# Patient Record
Sex: MALE | Race: WHITE
[De-identification: names, ages, dates, MRNs, and addresses within clinical notes are randomized per-mention and may not be internally consistent; named-entity substitution may affect disease eponyms.]

---

## 2019-11-28 NOTE — RAD
Portable chest:



HISTORY: Chest pain



COMPARISON:  none



FINDINGS: Lung fields are clear. Heart and mediastinum appear unremarkable.  Vascularity is normal.

 Visualized osseous structures unremarkable.



IMPRESSION: No acute finding



Reported By: You Felix 

Electronically Signed:  11/28/2019 2:10 PM

## 2019-11-28 NOTE — CON
DATE OF CONSULTATION:  11/28/2019



REQUESTING PHYSICIAN:  Dr. Matute.



CHIEF COMPLAINT:  Chest pain.



HISTORY OF PRESENT ILLNESS:  The patient is a 69-year-old man with known coronary

disease having undergone previous stenting procedure about 9 years ago in [place].

He had done well until recently.  About a week ago, he had an episode of chest pain

that lasted 2 or 3 hours, and then this morning, he had another episode.  It was not

associated with shortness of breath, nausea, vomiting, or diaphoresis, but was of

sufficient severity and lasted long that he decided to present to the emergency

room.  Here, he was found to have some minimal ST elevation in lead III and then

when his pain worsened a bit, the ST elevation was a little bit more pronounced and

present in leads II, III, and aVF.  He was taken to the cath lab urgently.  He was

found to have three-vessel coronary artery disease and included ostial and mid LAD

lesions isolating a fairly large diagonal, a very high-grade ostial lesion in an

obtuse marginal and a very high-grade very proximal lesion in his right coronary

that was associated with filling defect suggestive of thrombus.  His chest pain has

since improved, but has not completely resolved. 



PAST MEDICAL HISTORY:  Significant for hypertension, hypercholesterolemia, and HIV

positivity.  He reports that his viral load has been undetectable having been

checked as recently as 2 weeks ago. 



MEDICATIONS:  His current list of medications is incomplete.



ALLERGIES:  HE DENIES ANY MEDICAL ALLERGIES.



SOCIAL HISTORY:  He describes having smoked heavily, but quit 20 or 30 years ago.



FAMILY HISTORY:  Negative for coronary disease in either of his parents.



REVIEW OF SYSTEMS:  Positive for some blurry vision but no monocular symptoms

consistent with amaurosis fugax.  No transient speech, facial, or extremity symptoms

consistent with TIAs.  Negative for claudication.  Negative for any shortness of

breath. 



PHYSICAL EXAMINATION:

GENERAL:  He currently is in no distress. 

VITAL SIGNS:  On presentation in the emergency room, his heart rate was 80 and blood

pressure 167/95.  His most recent vitals in the cath lab were heart rate 74 and

blood pressure 141/92.  Intravenous nitroglycerin was being started.  He is 5 feet 9

inches and weighs 183 pounds. 

HEENT:  He has no xanthelasma. 

NECK:  No JVD.  No carotid bruits. 

CHEST:  Clear to auscultation. 

CARDIAC:  He has a regular rate and rhythm without any obvious murmur or gallop. 

ABDOMEN:  Soft and nontender.  He has a sheath in place in the right groin. 

EXTREMITIES:  He has a palpable left femoral pulse, left dorsalis pedis pulse and

right posterior tibial pulse.  He has what appears to be plaque psoriasis overlying

his left patella.  He has no obvious varicosities.  He has no clubbing, cyanosis, or

edema. 

NEUROLOGIC:  Grossly nonfocal.



LABORATORY DATA:  His white count is 7.5, hemoglobin 17.1, hematocrit of 51.8, and

platelets 308,000.  His electrolytes were normal.  BUN was 18, creatinine 1.49,

glucose 167.  LFTs were normal.  Troponin was 0.172.  Albumin is 4.4, calcium 9.6. 



His EKG had a Q in lead III and some ST elevation in II, III and aVF. 



His chest x-ray showed very prominent pulmonary markings.  No overt cardiomegaly.

No obvious aortic knob calcification. 



His cardiac catheterization shows a right-dominant system with a three-vessel

disease and LVEF of around 40% to 45%.  LV pressure was 147/6 and an EDP of 12.

Aortic pressure on pullback was 145/82 with a mean of 111.  He has some mild distal

left main disease about 30% lesion.  He has about 60% lesion in the LAD proximal to

the first septal , diffuse irregularity in the midportion.  There is a

large diagonal that wraps around toward the apex and the LAD has serial lesions just

beyond that with slow filling in the segment that leads to the apex.  He has a

modest ostial lesion in a small ramus vessel, a very small OM1 and a subtotal lesion

in the OM2 with a large OM3.  The stent in the circumflex system crosses the origin

of the OM2, but it is widely patent with no compromise of that OM3.  He has thrombus

associated with proximal right coronary lesion that in essence makes that a subtotal

lesion that is about an 80% or 90% lesion in the distal right coronary with

irregularity without significant luminal compromise of a large PDA. 



IMPRESSION AND RECOMMENDATION:  ST-elevation myocardial infarction with improved but

ongoing chest pain with three-vessel disease that includes a subtotal circumflex

system lesion and a subtotal lesion of the proximal RCA associated with thrombus.

Hemodynamically, the patient is stable.  We will plan on urgent revascularization. 







Job ID:  [id number]

## 2019-11-28 NOTE — HP
HISTORY OF PRESENT ILLNESS:  Tung Saldivar is a 69-year-old white male, who

presents with chest pain.  Approximately 9 years ago, he underwent stent 
placement

at [facility].  He states he took Plavix for 
approximately

6 years after the stent was placed.  He did have one episode of chest pain 
lasting 2

to 3 hours one week ago.  Then, this morning at 10:00 a.m., approximately 4 
hours

and 40 minutes ago, he had onset of central chest pressure.  He denies any 
shortness

of breath, nausea, vomiting, or diaphoresis.  He came to the emergency room, was

given aspirin 324 mg, three sublingual nitroglycerins, and 4000 units of 
heparin.

Initial EKG showed Q-wave in III and F with 0.5 mm of ST elevation.  Repeat EKG 
when

his pain somewhat worsened reveals 1.5 mm elevation in II, III, and aVF. 



PAST MEDICAL HISTORY:  Hypertension, hypercholesterolemia.  No history of 
diabetes.

He is HIV positive, however, his family does not know. 



MEDICATIONS:  He is unsure.  He takes gabapentin, tramadol, meloxicam, aspirin 
81

mg, blood pressure medicine and cholesterol medicine. 



ALLERGIES:  NONE.



OPERATIONS:  None.



SOCIAL HISTORY:  He stopped smoking 30 years ago.  He has a glass of wine 2 
times

per week. 



FAMILY HISTORY:  Negative for myocardial infarction or CABG.  His mother does 
have a

pacemaker. 



REVIEW OF SYSTEMS:  A 10-point review of systems is otherwise unremarkable.



PHYSICAL EXAMINATION:

VITAL SIGNS:  Blood pressure 132/80, pulse of 90. 

HEENT:  PERRL with bilateral ear creases. 

CHEST:  Clear. 

CARDIAC:  S1 and S2 normal without any S3, S4, or murmurs.  Carotid upstrokes 
normal

without bruits. 

ABDOMEN:  Normal bowel sounds without tenderness, organomegaly, or masses. 

EXTREMITIES:  Reveal no clubbing, cyanosis, or edema. 

NEUROLOGIC:  Grossly intact.



LABORATORY DATA:  CBC is unremarkable.  Sodium 139, potassium 4.3, chloride 102,

carbon dioxide 29, BUN 18, creatinine 1.49, glucose 167.  Troponin-I 0.172. 



IMPRESSION:  

1. Inferior ST segment elevation myocardial infarction.

2. History of stent placement 9 years ago in [place].  He did not have any 
problems

taking Plavix for 6 years with no gastrointestinal bleeding. 

3. Hypertension.

4. Hypercholesterolemia.

5. Renal insufficiency.

6. Elevated blood sugar.

7. Human immunodeficiency virus positive.



PLAN:  Situation discussed with the patient and his sister.  It is recommended 
that he

undergo emergent cardiac catheterization.  Risks have been discussed - death,

myocardial infarction, CVA, bleeding requiring transfusion, blood vessel injury 
with

operative repair, kidney damage leading to dialysis, and allergic reaction to 
the

dye.  Additional risk with stent placement discussed including death, myocardial

infarction, emergent CABG, restenosis, stent thrombosis, vessel perforation, 
etc.

He did not have problems with Plavix in the past and does not have any upcoming

surgeries.  He has never had a previous stroke, and overall, it is recommended 
that

a drug-eluting stent will be placed if needed. 







Job ID:  [id number]



MTDD

## 2019-11-29 NOTE — RAD
RADIOGRAPH CHEST 1 VIEW:



DATE:

11/28/2019



TIME:

11:05 PM



HISTORY:

69-year-old male status post intubation



COMPARISON:

11/28/2019

1:59 PM



FINDINGS:

Supine positioning makes this insensitive for pneumothorax detection. New sternotomy wires. New left 
lateral chest tube ascending from left base with distal tip near apex.

New left subclavian central venous catheter with distal tip overlying SVC. New endotracheal tube at m
id thoracic trachea. Double new mediastinal tubes. New left pleural effusion and dense

opacification of left lower lobe.



IMPRESSION:

1) very recently status post open heart surgery with multiple life support lines.

2) left lower lobe atelectasis and left pleural effusion.



Reported By: Brendon Malik 

Electronically Signed:  11/29/2019 12:12 AM

## 2019-11-29 NOTE — RAD
CHEST 1 VIEW:

 

INDICATION: 

Status post open heart surgery.

 

COMPARISON: 

Prior exam dated 11/28/2019.

 

FINDINGS: 

The patient has been extubated.  Left subclavian central venous catheter is unchanged.  Midline media
stinal drain and left-sided thoracostomy tube is unchanged.  Left-sided pleural parenchymal opacity p
ersists.  Cardiomegaly is stable.  Right lung is clear.  No pneumothorax is evident.

 

IMPRESSION: 

Interval extubation.  Otherwise, stable exam.

 

POS: [facility]

## 2019-11-29 NOTE — OP
DATE OF PROCEDURE:  11/28/2019



PROCEDURE PERFORMED:  Emergent coronary artery bypass grafting x4 with left internal

mammary artery to the distal LAD and reverse greater saphenous vein grafts from the

aorta to the diagonal to the second obtuse marginal into the PDA. 



PREOPERATIVE DIAGNOSES:  Coronary artery disease with inferior ST-elevation

myocardial infarction and ongoing chest pain. 



POSTOPERATIVE DIAGNOSES:  Coronary artery disease with inferior ST-elevation

myocardial infarction and ongoing chest pain. 



ANESTHESIA:  General endotracheal anesthesia.



INDICATIONS:  The patient is a 69-year-old male with known coronary artery disease,

having undergone circumflex system stenting about 9 years ago.  He had a bout of

angina about a week ago and then today had severe protracted chest pain that waxed

and waned in intensity even after arrival to the hospital.  He was taken emergently

to the cath lab and found to have severe three-vessel coronary artery disease

including subtotal lesion in the very proximal right coronary associated with

filling defect consistent with thrombus.  He is now taken to the operating room for

revascularization. 



FINDINGS:  Pump time 105 minutes.  Cross-clamp time 56 minutes.  Good quality SEBASTIAN

and saphenous vein.  The LAD was about a 1.5 to 2 mm vessel.  The diagonal was about

a 2 mm vessel.  The second obtuse marginal about 2 mm and the PDA about 1.5 to 2 mm.

 The pericardium was closed. 



DESCRIPTION OF PROCEDURE:  After informed consent was obtained, the patient was

taken to the operating room, placed in supine position on the operating table.

After the induction of general anesthesia, the patient's left lower extremity

greater saphenous vein was ultrasonographically mapped and marked.  His left upper

chest was prepped and draped in sterile fashion and he was placed in Trendelenburg.

A triple-lumen central line kit was used to place a left subclavian central line by

the Seldinger technique.  All 3 ports easily aspirated and flushed.  The line was

secured to the skin with suture.  The dressing on the right femoral sheath was

removed.  That area was prepped and the line secured with suture.  The patient's

torso groins and lower extremities were then prepped and draped in sterile fashion.

The greater saphenous vein was exposed just above the left knee using a scalpel and

electrocautery.  It was then endoscopically harvested from the groin to the mid calf

using small stab incisions for the proximal and distal ligation and division.  The

vein was prepared for use as a graft and the port site was closed in layers of

subcutaneous and subcuticular Vicryl.  A median sternotomy was performed.  The left

internal mammary artery was harvested as a skeletonized in-situ graft from the level

of the xiphoid to just beyond the subclavian vein through an extrapleural exposure.

The patient was heparinized.  The mammary was ligated and divided distally.  There

was good flow through the mammary that was instilled intraluminally with papaverine

solution and dilated nicely.  The medial reflections of the pleura along the

mediastinum were developed.  A rent in the pleura near the apex was made during the

course of this.  The mammary bed was inspected for hemostasis.  The SEBASTIAN retractor

was replaced with a Brandon retractor.  The pericardium was opened and marsupialized.

The aorta was palpated and was soft.  A double concentric pursestring of 2-0

Ethibond was placed in the ascending aorta just beyond the pericardial reflection

and a single pursestring was placed in the right atrial appendage.  Aortic and

venous cannulae were inserted and secured by their pursestrings.  Cardiopulmonary

bypass was instituted and the patient was systemically cooled.  The heart was

examined and the vessel to be bypassed were identified.  A longitudinal slit was

made in the pericardium anterior to the left phrenic nerve.  There was a mammary

could be passed.  The plane between the aorta and the pulmonary artery was

developed.  An aortic cross-clamp was applied and cardioplegia was administered

through an aortic root needle.  When arrest have been achieved, attention was turned

to the distal right coronary system.  The distal right coronary and the proximal PDA

had hard palpable plaquing in it.  The PDA was exposed and opened just beyond that

plaque.  A saphenous vein was reversed and anastomosed there end-to-side with

running 6-0 Prolene suture.  The anastomosis was tested by flushing cold

cardioplegia down the graft.  Attention was then turned to the second obtuse

marginal.  Hard plaque was easily appreciable at its origin from the circumflex at

the groove.  It was opened just beyond that, and grafted in a similar fashion.  The

diagonal just proximal to the level of the hard plaque at the junction of the middle

and distal thirds of the LAD was then opened and grafted in a similar fashion.  The

LAD was then opened distally and the mammary anastomosed with running 7-0 Prolene.

The mammary was tacked to the epicardium.  The aortic cross-clamp was replaced with

a partial occluding clamp and aortotomy was made in ascending aorta with a scalpel

and punch incorporating the root needle site into what would be the most proximal

aortotomy.  The PDA graft was brought along the right side of the heart and

anastomosed to that proximal aortotomy.  The diagonal and OM grafts were brought

along the left side of the heart and anastomosed to the middle and distal

aortotomies respectively.  The partial occluding clamp was removed and the vein

grafts were de-aired.  The bulldogs were removed from them.  The anastomoses were

inspected for hemostasis.  The proximal anastomoses were marked with small

hemoclips.  Left pleural and posterior pericardial drains were brought out through

separate incisions and secured with suture.  Right atrial and right ventricular

temporary epicardial pacing wires were placed.  The patient was then easily

 from cardiopulmonary bypass.  The aortic and venous cannulae were removed

and the pursestring secured.  Protamine was administered.  When hemostasis was

adequate, an anterior mediastinal drain was placed.  The pericardium was easily

closed over with running Vicryl.  The cut surfaces of the sternum were treated with

platelet rich GPS and vancomycin paste.  The sternum was then reapproximated with #7

stainless steel wires.  The soft tissue was irrigated and treated with poor GPS.

The fascia was closed over the wires with a running #1 Vicryl.  The subcutaneous

tissue was reapproximated with a running 2-0 Vicryl and the skin was closed with a

running 3-0 Vicryl subcuticular suture.  The wounds were dressed and the patient was

taken to the intensive care unit in a stable condition. 







Job ID:  [id number]

## 2019-11-30 NOTE — EKG
Test Reason : STAT

Blood Pressure : ***/*** mmHG

Vent. Rate : 083 BPM     Atrial Rate : 044 BPM

   P-R Int : 000 ms          QRS Dur : 078 ms

    QT Int : 366 ms       P-R-T Axes : 000 019 034 degrees

   QTc Int : 430 ms

 

Accelerated Junctional rhythm

Inferior infarct , age undetermined

Abnormal ECG

No previous ECGs available

Confirmed by DR. TAMMY GUZMAN MD ([id number]) on 11/30/2019 9:56:00 AM

 

Referred By:  EILEEN           Confirmed By:DR. TAMMY GUZMAN MD

## 2019-11-30 NOTE — PDOC.CPN
- Subjective


Date: 11/30/19


Time: 11:00


Interval history: 





Doing well. Still very sore but better than yesterday when he stated he could 

not even move. 





- Review of Systems


General: denies: fever/chills, weight/appetite/sleep changes, night sweats, 

fatigue


Respiratory: reports: cough.  denies: congestion, shortness of breath, exercise 

intolerance


Cardiovascular: denies: chest pain, palpitation, edema, paroxysmal nocturnal 

dyspnea, orthopnea


Gastrointestinal: denies: nausea, vomiting, diarrhea, constipation, abd pain, 

GI bleeding


Musculoskeletal: reports: pain.  denies: tenderness, stiffness, swelling, 

arthritis/arthralgias


Neurological: denies: numbness, syncope, seizure, weakness





- Objective


Allergies/Adverse Reactions: 


 Allergies











Allergy/AdvReac Type Severity Reaction Status Date / Time


 


No Known Allergies Allergy   Unverified 11/28/19 15:57











Visit Medications: 


 Current Medications





Acetaminophen (Tylenol)  650 mg PO Q6H PRN


   PRN Reason: Headache/Fever Or Mild Pain


Hydrocodone Bitart/Acetaminophen (Norco 5/325)  1 tab PO Q4H PRN


   PRN Reason: Moderate Pain (4-6)


   Last Admin: 11/30/19 01:35 Dose:  1 tab


Hydrocodone Bitart/Acetaminophen (Norco 5/325)  2 tab PO Q4H PRN


   PRN Reason: Severe Pain (7-10)


   Last Admin: 11/30/19 06:25 Dose:  2 tab


Al Hydroxide/Mg Hydroxide (Maalox)  30 ml PO Q4H PRN


   PRN Reason: Indigestion


Albuterol/Ipratropium (Duoneb)  3 ml NEB [id number] PRN


   PRN Reason: SHORTNESS OF BREATH


Alprazolam (Xanax)  0.5 mg PO Q8H PRN


   PRN Reason: Anxiety


Aspirin (Aspirin Chewable)  81 mg PO DAILY [facility]


   Last Admin: 11/30/19 08:58 Dose:  81 mg


Atorvastatin Calcium (Lipitor)  40 mg PO HS [facility]


   Last Admin: 11/29/19 21:23 Dose:  40 mg


Bisacodyl (Dulcolax)  10 mg PO Q12H PRN


   PRN Reason: Constipation


Bisacodyl (Dulcolax)  10 mg SD Q12H PRN


   PRN Reason: Constipation


Carvedilol (Coreg)  3.125 mg PO BID-[facility]


   Last Admin: 11/30/19 08:58 Dose:  3.125 mg


Dextrose/Water (Dextrose 50%)  25 gm SLOW IVP PRN PRN


   PRN Reason: PER HYPOGLYCEMIC PROTOCOL


Docusate Sodium (Colace)  100 mg PO BID [facility]


   Last Admin: 11/30/19 08:58 Dose:  100 mg


Ezetimibe (Zetia)  10 mg PO DAILY [facility]


   Last Admin: 11/30/19 08:58 Dose:  10 mg


Fentanyl (Sublimaze)  25 mcg SLOW IVP Q2H PRN


   PRN Reason: Moderate Pain (4-6)


   Stop: 11/30/19 17:01


Fentanyl (Sublimaze)  50 mcg SLOW IVP Q2H PRN


   PRN Reason: Severe Pain (7-10)


   Stop: 11/30/19 17:01


   Last Admin: 11/29/19 13:03 Dose:  50 mcg


Gabapentin (Neurontin)  600 mg PO TID [facility]


   Last Admin: 11/30/19 08:58 Dose:  600 mg


Glucagon (Glucagon)  1 mg SC PRN PRN


   PRN Reason: PER HYPOGLYCEMIC PROTOCOL


Guaifenesin/Dextromethorphan (Robitussin Dm)  15 ml PO Q4H PRN


   PRN Reason: Cough


Hydralazine HCl (Apresoline)  10 mg SLOW IVP Q6H PRN


   PRN Reason: To Maintain SBP< 140mmHG


Cefazolin Sodium/Dextrose 2 gm (/ Device)  50 mls @ 100 mls/hr IVPB ONCALL-OR 

[facility]


Heparin Sodium (Porcine) 30, (000 units/ Sodium Chloride)  1,003 mls @ 0 mls/hr 

FS WILLCALL [facility]


Norepinephrine Bitartrate (Levophed)  250 mls @ 0 mls/hr IVPB PRN PRN; Protocol


   PRN Reason: To maintain SBP > 90 mmHG


   Last Admin: 11/29/19 01:39 Dose:  250 mls


Nicardipine HCl 25 mg/ Sodium (Chloride)  250 mls @ 0 mls/hr IVPB INF PRN; 

Protocol


   PRN Reason: To Maintain SBP< 140mmHG


Nitroglycerin/Dextrose (Nitroglycerin 50 Mg/250 Ml Bot)  250 mls @ 0 mls/hr 

IVPB PRN PRN; Protocol


   PRN Reason: To Maintain SBP< 140mmHG


Sodium Chloride (Normal Saline 0.9%)  1,000 mls @ 125 mls/hr IV .Q8H [facility]


   Last Admin: 11/30/19 08:59 Dose:  Not Given


Insulin Human Regular 100 (units/ Sodium Chloride)  101 mls @ 0 mls/hr IVPB INF 

JAIME; Protocol


   Last Admin: 11/28/19 23:56 Dose:  101 mls


Dextrose/Water (D5w)  1,000 mls @ 0 mls/hr IV INF PRN


   PRN Reason: PRN HYPOGLYCEMIC PROTOCOL


Insulin Human Regular (Humulin R)  0 units SC Q4H PRN; Protocol


   PRN Reason: POST OP SLIDING SCALE


Meloxicam (Mobic)  15 mg PO DAILY [facility]


   Last Admin: 11/30/19 08:59 Dose:  15 mg


Morphine Sulfate (Morphine)  2 mg SLOW IVP Q15MIN PRN


   PRN Reason: Severe Pain (7-10)


   Last Admin: 11/29/19 04:22 Dose:  2 mg


Ondansetron HCl (Zofran)  4 mg IVP Q6H PRN


   PRN Reason: Nausea/Vomiting


Pantoprazole Sodium (Protonix)  40 mg PO DAILY [facility]


   Last Admin: 11/30/19 08:58 Dose:  40 mg


Potassium Chloride (Kcl)  20 meq IVPB PRN PRN


   PRN Reason: K level </= 4.0


   Last Admin: 11/30/19 09:03 Dose:  20 meq


Promethazine HCl (Phenergan)  6.25 mg IM Q4H PRN


   PRN Reason: Nausea/Vomiting


Sodium Chloride (Flush - Normal Saline)  10 ml IVF Q12HR [facility]


   Last Admin: 11/30/19 08:59 Dose:  10 ml








Vital Signs & Weight: 


 Vital Signs











  Temp Pulse Ox


 


 11/30/19 08:00  98.7 F  94 L


 


 11/30/19 03:00  98.6 F 


 


 11/30/19 00:00  99.5 F 








 











Weight                         172 lb 6.424 oz

















- Physical Exam


General: alert & oriented x3


HEENT: mucus membranes moist


Neck: supple neck


Cardiac: regular rate and rhythm


Lungs: clear to auscultation


Neuro: grossly intact


Abdomen: active bowel sounds, soft, non-tender


Extremities: 1+ LE edema


Skin: clear


Musculoskeletal: no pain





- Labs


Result Diagrams: 


 11/30/19 03:43





 11/30/19 03:43


 Troponin/CKMB











CK-MB (CK-2)  4.5 ng/mL (0-6.6)   11/28/19  13:42    


 


Troponin I  29.463 ng/mL (< 0.028)  H*  11/29/19  09:03    














- Telemetry


Sinus rhythms and dysrhythmias: sinus rhythm





- Assessment/Plan


Assessment/Plan: 





1. Inferior STEMI


2. S/P Emergent CABG


3. LCx stent 9 yrs ago


4. HTN


5. HLP


6. HIV positive





PLAN:


- Aspirin and Statin for life


- Agree with up titrating coreg. 


- Add ACEi in next 24 to 48 hrs if BP allows. 


- Increase PT as tolerated.

## 2019-11-30 NOTE — RAD
CHEST 1 VIEW:

 

INDICATION: 

Status post open heart surgery.

 

COMPARISON: 

Prior exam 11/29/2019.

 

IMPRESSION: 

Left subclavian central venous catheter and left-sided thoracostomy tube is unchanged.  Midline media
stinal drains are unchanged.  Mild cardiomegaly persists.  Mild pulmonary vascular congestion persist
s.  Low lung volumes and bibasilar atelectasis persist.  Small left pleural effusion persists.  No pn
eumothorax is demonstrated.

 

POS: BH

## 2019-12-01 NOTE — PDOC.CPN
- Subjective


Date: 12/01/19


Time: 17:45


Interval history: 





He is doing well. Chest tubes still in . No BM's but passing gas.





- Review of Systems


General: denies: fever/chills, weight/appetite/sleep changes, night sweats, 

fatigue


Respiratory: denies: cough, congestion, shortness of breath, exercise 

intolerance


Cardiovascular: reports: edema.  denies: chest pain, palpitation, paroxysmal 

nocturnal dyspnea, orthopnea


Gastrointestinal: denies: nausea, vomiting, diarrhea, constipation, abd pain, 

GI bleeding


Musculoskeletal: reports: pain.  denies: tenderness, stiffness, swelling, 

arthritis/arthralgias


Neurological: denies: numbness, syncope, seizure, weakness





- Objective


Allergies/Adverse Reactions: 


 Allergies











Allergy/AdvReac Type Severity Reaction Status Date / Time


 


No Known Allergies Allergy   Unverified 11/28/19 15:57











Visit Medications: 


 Current Medications





Hydrocodone Bitart/Acetaminophen (Norco 5/325)  1 tab PO Q4H PRN


   PRN Reason: PAIN SCALE 1-5


   Last Admin: 12/01/19 11:58 Dose:  1 tab


Hydrocodone Bitart/Acetaminophen (Norco 5/325)  2 tab PO Q4H PRN


   PRN Reason: PAIN SCALE 6-10


   Last Admin: 11/30/19 17:35 Dose:  2 tab


Al Hydroxide/Mg Hydroxide (Maalox)  30 ml PO Q4H PRN


   PRN Reason: Indigestion


Al Hydroxide/Mg Hydroxide (Maalox)  30 ml PO Q4H PRN


   PRN Reason: Indigestion


Albuterol/Ipratropium (Duoneb)  3 ml NEB [id number] PRN


   PRN Reason: SHORTNESS OF BREATH


Alprazolam (Xanax)  0.5 mg PO Q8H PRN


   PRN Reason: Anxiety


Artificial Tears (Tears Naturale)  0 drop EA EYE PRN PRN


   PRN Reason: Dry Eyes


Aspirin (Aspirin Chewable)  81 mg PO DAILY [facility]


   Last Admin: 12/01/19 10:08 Dose:  81 mg


Atorvastatin Calcium (Lipitor)  40 mg PO HS [facility]


   Last Admin: 11/30/19 20:29 Dose:  40 mg


Bisacodyl (Dulcolax)  10 mg PO Q12H PRN


   PRN Reason: Constipation


Bisacodyl (Dulcolax)  10 mg WI Q12H PRN


   PRN Reason: Constipation


Bisacodyl (Dulcolax)  10 mg PO Q12H PRN


   PRN Reason: Constipation


Bisacodyl (Dulcolax)  10 mg WI Q12H PRN


   PRN Reason: Constipation


Carvedilol (Coreg)  6.25 mg PO BID-[facility]


   Last Admin: 12/01/19 10:08 Dose:  6.25 mg


Diphenhydramine HCl (Benadryl)  25 mg PO Q6H PRN


   PRN Reason: Itching & Insomnia or Renan Huber


Docusate Sodium (Colace)  100 mg PO BID [facility]


   Last Admin: 12/01/19 10:08 Dose:  100 mg


Ezetimibe (Zetia)  10 mg PO DAILY [facility]


   Last Admin: 12/01/19 10:07 Dose:  10 mg


Furosemide (Lasix)  40 mg PO 0900,1400 [facility]


   Stop: 12/02/19 09:01


   Last Admin: 12/01/19 14:51 Dose:  40 mg


Gabapentin (Neurontin)  600 mg PO TID [facility]


   Last Admin: 12/01/19 14:51 Dose:  600 mg


Guaifenesin/Dextromethorphan (Robitussin Dm)  15 ml PO Q4H PRN


   PRN Reason: Cough


Guaifenesin/Dextromethorphan (Robitussin Dm)  15 ml PO Q4H PRN


   PRN Reason: Cough


Meloxicam (Mobic)  15 mg PO DAILY [facility]


   Last Admin: 12/01/19 10:08 Dose:  15 mg


Mineral Oil (Fleet Mineral Oil)  133 ml WI DAILYPRN PRN


   PRN Reason: Constipation


Ondansetron HCl (Zofran)  4 mg IVP Q6H PRN


   PRN Reason: Nausea/Vomiting


Pantoprazole Sodium (Protonix)  40 mg PO DAILY [facility]


   Last Admin: 12/01/19 10:08 Dose:  40 mg


Sodium Chloride (Flush - Normal Saline)  10 ml IVF Q12HR [facility]


   Last Admin: 12/01/19 10:09 Dose:  10 ml


Zolpidem Tartrate (Ambien)  5 mg PO HSPRN PRN


   PRN Reason: Insomnia








Vital Signs & Weight: 


 Vital Signs











  Temp Pulse Pulse Resp BP BP BP


 


 12/01/19 13:29    87    99/59 L 


 


 12/01/19 11:37    105 H    174/91 H 


 


 12/01/19 11:25  98.1 F  104 H   20    107/76


 


 12/01/19 10:08      150/92 H  


 


 12/01/19 07:40  98.3 F  86   18    130/78


 


 12/01/19 07:30       














  Pulse Ox


 


 12/01/19 13:29 


 


 12/01/19 11:37 


 


 12/01/19 11:25  92 L


 


 12/01/19 10:08 


 


 12/01/19 07:40  95


 


 12/01/19 07:30  96








 











Weight                         175 lb 3.2 oz

















- Physical Exam


General: alert & oriented x3


HEENT: mucus membranes moist


Neck: supple neck


Cardiac: regular rate and rhythm, no murmur


Lungs: normal breath sounds


Neuro: grossly intact


Abdomen: active bowel sounds


Extremities: 1+ LE edema


Skin: clear


Musculoskeletal: no pain





- Labs


Result Diagrams: 


 12/01/19 03:29





 12/01/19 03:29


 Troponin/CKMB











CK-MB (CK-2)  4.5 ng/mL (0-6.6)   11/28/19  13:42    


 


Troponin I  29.463 ng/mL (< 0.028)  H*  11/29/19  09:03    














- Telemetry


Sinus rhythms and dysrhythmias: sinus rhythm





- Assessment/Plan


Assessment/Plan: 





1. Inferior STEMI


2. S/P Emergent CABG


3. LCx stent 9 yrs ago


4. HTN


5. HLP


6. HIV positive





PLAN:


- Aspirin and Statin for life


- Continue BB at current dose. 


- BP borderline low to add CEI. 


- Increase PT as tolerated.

## 2019-12-01 NOTE — RAD
PORTABLE CHEST:

 

HISTORY: 

Post sternotomy/CABG procedure.

 

COMPARISON: 

11/30/2019.

 

FINDINGS: 

Left basilar atelectasis and/or infiltrate obscures the CP angle.  There is a left chest tube.  Lungs
 appear well aerated and otherwise clear.  Central line is unchanged.  

 

IMPRESSION: 

Stable findings when compared to 11/30/2019.

 

POS: OFF

## 2019-12-02 NOTE — RAD
Chest AP view



INDICATION: Status post CABG



COMPARISON: December 1, 2019



FINDINGS:



Lungs:Persistent bibasilar airspace opacities suspicious for atelectasis. There is a very stable tiny
 left apical pneumothorax.



Cardiac silhouette:Mild cardiomegaly is stable. Midline sternotomy changes are stable.



Pulmonary vasculature:Mild pulmonary vascular congestion remains.



Pleural spaces:Small bilateral pleural effusions, left greater than right are stable.



Upper abdomen:No abnormality seen.



Osseous structures: No acute osseous abnormality.



Additional findings:Left subclavian central venous catheter, left-sided thoracostomy tube and midline
 mediastinal drain is stable.



IMPRESSION: Stable tiny left apical pneumothorax. Stable tubes and lines. Persistent bibasilar atelec
tasis. Stable mild cardiomegaly with mild pulmonary vascular congestion and bilateral pleural

effusions



Reported By: Paul Clark 

Electronically Signed:  12/2/2019 8:35 AM

## 2019-12-02 NOTE — EKG
Test Reason : STAT

Blood Pressure : ***/*** mmHG

Vent. Rate : 100 BPM     Atrial Rate : 100 BPM

   P-R Int : 184 ms          QRS Dur : 072 ms

    QT Int : 318 ms       P-R-T Axes : 048 014 042 degrees

   QTc Int : 410 ms

 

Normal sinus rhythm

Inferior infarct (cited on or before 28-NOV-2019)

Abnormal ECG

When compared with ECG of 28-NOV-2019 23:21,

Sinus rhythm has replaced Junctional rhythm

Confirmed by MEGAN SHIELDS,  SDelfina ([id number]) on 12/2/2019 3:59:14 PM

 

Referred By:  ABDIEL           Confirmed By:DR. TAMMY GUZMAN MD

## 2019-12-03 NOTE — DIS
DATE OF ADMISSION:  11/28/2019



DATE OF DISCHARGE:  12/03/2019



PRINCIPAL DIAGNOSIS:  Coronary artery disease with ST-elevation myocardial

infarction. 



SECONDARY DIAGNOSES:  

1. Human immunodeficiency virus positivity.

2. Hypertension.

3. Hypercholesterolemia.

4. Osteoarthritis.



PROCEDURES PERFORMED:  Cardiac catheterization on 11/28/2019.  Coronary artery

bypass grafting x4 with left internal mammary artery to the distal LAD and separate

reverse greater saphenous vein graft from aorta to the diagonal, the second obtuse

marginal, and the posterior descending artery on 11/28/2019. 



HISTORY OF PRESENT ILLNESS AND HOSPITAL COURSE:  The patient is a 69-year-old man

with a known history of coronary artery disease, having undergone PCI about 9 years

ago.  He had done well until recently.  About a week prior to his presentation, he

had a 2-to 3-hour episode of chest pain and then on the morning of presentation, he

had another episode that was associated with shortness of breath, nausea, vomiting,

and diuresis.  The symptoms were severe enough and protracted enough to prompt

seeking medical attention.  He was found to have inferior ST elevation that waxed

and waned with the intensity of his pain.  Cardiac catheterization demonstrated

minimal distal left main disease, but proximal LAD disease as well as a subtotal

lesion in a large obtuse marginal and thrombus associated with this very high-grade

lesion in the very proximal right coronary.  He also had a high-grade lesion in the

distal right.  He had low normal LVEF with an EDP of 12.  Because of the pattern of

disease and his ongoing chest pain, he was taken urgently to the OR for surgical

revascularization.  He had an uncomplicated postoperative course.  He was extubated

only a few hours postop and was weaned off his Levophed.  He was diuresed.

Beta-blockade was initiated and gradually increased.  ACE inhibition was deferred

because of those blood pressures.  Today on postoperative day 5, he is doing well

and being discharged home on Coreg 6.25 mg b.i.d., Lipitor 40 mg at bedtime, aspirin

a day, and his normal home medications. 







Job ID:  [id number]

## 2020-03-02 NOTE — EKG
Test Reason : STAT

Blood Pressure : ***/*** mmHG

Vent. Rate : 068 BPM     Atrial Rate : 068 BPM

   P-R Int : 176 ms          QRS Dur : 070 ms

    QT Int : 326 ms       P-R-T Axes : 045 010 090 degrees

   QTc Int : 346 ms

 

Sinus rhythm with Premature atrial complexes in a pattern of bigeminy

Inferior infarct (cited on or before 28-NOV-2019)

Abnormal ECG

When compared with ECG of 29-NOV-2019 19:47, (Unconfirmed)

Premature atrial complexes are now Present

QT has shortened

Confirmed by MEGAN SHIELDS, DR. MUNOZ (4) on 12/2/2019 3:59:36 PM

 

Referred By:  [facility]           Confirmed By:DR. TAMMY GUZMAN MD not applicable

## 2022-07-07 ENCOUNTER — HOSPITAL ENCOUNTER (OUTPATIENT)
Dept: HOSPITAL 92 - SCSCT | Age: 72
Discharge: HOME | End: 2022-07-07
Attending: NEUROLOGICAL SURGERY
Payer: MEDICARE

## 2022-07-07 DIAGNOSIS — M43.16: ICD-10-CM

## 2022-07-07 DIAGNOSIS — M48.062: Primary | ICD-10-CM

## 2022-07-07 DIAGNOSIS — M47.816: ICD-10-CM

## 2022-07-07 DIAGNOSIS — N20.0: ICD-10-CM

## 2022-07-07 PROCEDURE — 72131 CT LUMBAR SPINE W/O DYE: CPT

## 2022-09-29 ENCOUNTER — HOSPITAL ENCOUNTER (OUTPATIENT)
Dept: HOSPITAL 92 - LABBT | Age: 72
Discharge: HOME | End: 2022-09-29
Attending: NEUROLOGICAL SURGERY
Payer: COMMERCIAL

## 2022-09-29 DIAGNOSIS — Z20.822: ICD-10-CM

## 2022-09-29 DIAGNOSIS — M48.062: ICD-10-CM

## 2022-09-29 DIAGNOSIS — Z01.812: Primary | ICD-10-CM

## 2022-09-29 DIAGNOSIS — M43.16: ICD-10-CM

## 2022-09-29 LAB
ANION GAP SERPL CALC-SCNC: 17 MMOL/L (ref 10–20)
APTT PPP: 25.4 SEC (ref 22–33)
BUN SERPL-MCNC: 21 MG/DL (ref 8.4–25.7)
CALCIUM SERPL-MCNC: 9.7 MG/DL (ref 7.8–10.44)
CHLORIDE SERPL-SCNC: 101 MMOL/L (ref 98–107)
CO2 SERPL-SCNC: 27 MMOL/L (ref 23–31)
CREAT CL PREDICTED SERPL C-G-VRATE: 0 ML/MIN (ref 70–130)
GLUCOSE SERPL-MCNC: 76 MG/DL (ref 83–110)
HGB BLD-MCNC: 17 G/DL (ref 13.5–17.5)
INR PPP: 1
MCH RBC QN AUTO: 30.1 PG (ref 27–33)
MCV RBC AUTO: 91.3 FL (ref 81.2–95.1)
PLATELET # BLD AUTO: 267 10X3/UL (ref 150–450)
POTASSIUM SERPL-SCNC: 3.9 MMOL/L (ref 3.5–5.1)
PROTHROMBIN TIME: 10.5 SEC (ref 9.5–12.1)
RBC # BLD AUTO: 5.64 10X6/UL (ref 4.32–5.72)
SODIUM SERPL-SCNC: 141 MMOL/L (ref 136–145)
WBC # BLD AUTO: 6.5 10X3/UL (ref 3.5–10.5)

## 2022-09-29 PROCEDURE — 87811 SARS-COV-2 COVID19 W/OPTIC: CPT

## 2022-09-29 PROCEDURE — 85027 COMPLETE CBC AUTOMATED: CPT

## 2022-09-29 PROCEDURE — 86901 BLOOD TYPING SEROLOGIC RH(D): CPT

## 2022-09-29 PROCEDURE — 85730 THROMBOPLASTIN TIME PARTIAL: CPT

## 2022-09-29 PROCEDURE — 85610 PROTHROMBIN TIME: CPT

## 2022-09-29 PROCEDURE — 86850 RBC ANTIBODY SCREEN: CPT

## 2022-09-29 PROCEDURE — 86900 BLOOD TYPING SEROLOGIC ABO: CPT

## 2022-09-29 PROCEDURE — 80048 BASIC METABOLIC PNL TOTAL CA: CPT

## 2022-10-04 ENCOUNTER — HOSPITAL ENCOUNTER (INPATIENT)
Dept: HOSPITAL 92 - SDC | Age: 72
LOS: 6 days | Discharge: HOME | DRG: 455 | End: 2022-10-10
Attending: NEUROLOGICAL SURGERY | Admitting: NEUROLOGICAL SURGERY
Payer: COMMERCIAL

## 2022-10-04 VITALS — BODY MASS INDEX: 26 KG/M2

## 2022-10-04 DIAGNOSIS — Z79.82: ICD-10-CM

## 2022-10-04 DIAGNOSIS — Z87.891: ICD-10-CM

## 2022-10-04 DIAGNOSIS — M47.26: ICD-10-CM

## 2022-10-04 DIAGNOSIS — M71.38: ICD-10-CM

## 2022-10-04 DIAGNOSIS — Z20.822: ICD-10-CM

## 2022-10-04 DIAGNOSIS — M48.062: Primary | ICD-10-CM

## 2022-10-04 DIAGNOSIS — Z28.21: ICD-10-CM

## 2022-10-04 PROCEDURE — 86901 BLOOD TYPING SEROLOGIC RH(D): CPT

## 2022-10-04 PROCEDURE — 93970 EXTREMITY STUDY: CPT

## 2022-10-04 PROCEDURE — 85730 THROMBOPLASTIN TIME PARTIAL: CPT

## 2022-10-04 PROCEDURE — 87811 SARS-COV-2 COVID19 W/OPTIC: CPT

## 2022-10-04 PROCEDURE — 96376 TX/PRO/DX INJ SAME DRUG ADON: CPT

## 2022-10-04 PROCEDURE — C1768 GRAFT, VASCULAR: HCPCS

## 2022-10-04 PROCEDURE — 01NB0ZZ RELEASE LUMBAR NERVE, OPEN APPROACH: ICD-10-PCS | Performed by: NEUROLOGICAL SURGERY

## 2022-10-04 PROCEDURE — 85027 COMPLETE CBC AUTOMATED: CPT

## 2022-10-04 PROCEDURE — 0SG30AJ FUSION OF LUMBOSACRAL JOINT WITH INTERBODY FUSION DEVICE, POSTERIOR APPROACH, ANTERIOR COLUMN, OPEN APPROACH: ICD-10-PCS | Performed by: NEUROLOGICAL SURGERY

## 2022-10-04 PROCEDURE — 96374 THER/PROPH/DIAG INJ IV PUSH: CPT

## 2022-10-04 PROCEDURE — 0SG0071 FUSION OF LUMBAR VERTEBRAL JOINT WITH AUTOLOGOUS TISSUE SUBSTITUTE, POSTERIOR APPROACH, POSTERIOR COLUMN, OPEN APPROACH: ICD-10-PCS | Performed by: NEUROLOGICAL SURGERY

## 2022-10-04 PROCEDURE — 86850 RBC ANTIBODY SCREEN: CPT

## 2022-10-04 PROCEDURE — 0SG00AJ FUSION OF LUMBAR VERTEBRAL JOINT WITH INTERBODY FUSION DEVICE, POSTERIOR APPROACH, ANTERIOR COLUMN, OPEN APPROACH: ICD-10-PCS | Performed by: NEUROLOGICAL SURGERY

## 2022-10-04 PROCEDURE — 85025 COMPLETE CBC W/AUTO DIFF WBC: CPT

## 2022-10-04 PROCEDURE — 76000 FLUOROSCOPY <1 HR PHYS/QHP: CPT

## 2022-10-04 PROCEDURE — C1713 ANCHOR/SCREW BN/BN,TIS/BN: HCPCS

## 2022-10-04 PROCEDURE — 3E0U0GB INTRODUCTION OF RECOMBINANT BONE MORPHOGENETIC PROTEIN INTO JOINTS, OPEN APPROACH: ICD-10-PCS | Performed by: NEUROLOGICAL SURGERY

## 2022-10-04 PROCEDURE — 86900 BLOOD TYPING SEROLOGIC ABO: CPT

## 2022-10-04 PROCEDURE — C1776 JOINT DEVICE (IMPLANTABLE): HCPCS

## 2022-10-04 PROCEDURE — 96375 TX/PRO/DX INJ NEW DRUG ADDON: CPT

## 2022-10-04 PROCEDURE — 0SG3071 FUSION OF LUMBOSACRAL JOINT WITH AUTOLOGOUS TISSUE SUBSTITUTE, POSTERIOR APPROACH, POSTERIOR COLUMN, OPEN APPROACH: ICD-10-PCS | Performed by: NEUROLOGICAL SURGERY

## 2022-10-04 PROCEDURE — G0378 HOSPITAL OBSERVATION PER HR: HCPCS

## 2022-10-04 PROCEDURE — 36415 COLL VENOUS BLD VENIPUNCTURE: CPT

## 2022-10-04 PROCEDURE — 80048 BASIC METABOLIC PNL TOTAL CA: CPT

## 2022-10-04 PROCEDURE — 85610 PROTHROMBIN TIME: CPT

## 2022-10-05 LAB
ANION GAP SERPL CALC-SCNC: 12 MMOL/L (ref 10–20)
BASOPHILS # BLD AUTO: 0 THOU/UL (ref 0–0.2)
BASOPHILS NFR BLD AUTO: 0.1 % (ref 0–1)
BUN SERPL-MCNC: 28 MG/DL (ref 8.4–25.7)
CALCIUM SERPL-MCNC: 8.2 MG/DL (ref 7.8–10.44)
CHLORIDE SERPL-SCNC: 103 MMOL/L (ref 98–107)
CO2 SERPL-SCNC: 26 MMOL/L (ref 23–31)
CREAT CL PREDICTED SERPL C-G-VRATE: 57 ML/MIN (ref 70–130)
EOSINOPHIL # BLD AUTO: 0 THOU/UL (ref 0–0.7)
EOSINOPHIL NFR BLD AUTO: 0.1 % (ref 0–10)
GLUCOSE SERPL-MCNC: 129 MG/DL (ref 83–110)
HGB BLD-MCNC: 11.9 G/DL (ref 14–18)
LYMPHOCYTES # BLD: 1.6 THOU/UL (ref 1.2–3.4)
LYMPHOCYTES NFR BLD AUTO: 14.2 % (ref 21–51)
MCH RBC QN AUTO: 31.3 PG (ref 27–31)
MCV RBC AUTO: 95.8 FL (ref 78–98)
MONOCYTES # BLD AUTO: 1 THOU/UL (ref 0.11–0.59)
MONOCYTES NFR BLD AUTO: 8.4 % (ref 0–10)
NEUTROPHILS # BLD AUTO: 8.9 THOU/UL (ref 1.4–6.5)
NEUTROPHILS NFR BLD AUTO: 77.2 % (ref 42–75)
PLATELET # BLD AUTO: 216 THOU/UL (ref 130–400)
POTASSIUM SERPL-SCNC: 3.6 MMOL/L (ref 3.5–5.1)
RBC # BLD AUTO: 3.82 MILL/UL (ref 4.7–6.1)
SODIUM SERPL-SCNC: 137 MMOL/L (ref 136–145)
WBC # BLD AUTO: 11.5 THOU/UL (ref 4.8–10.8)

## 2022-10-05 RX ADMIN — HYDROCODONE BITARTRATE AND ACETAMINOPHEN PRN TAB: 7.5; 325 TABLET ORAL at 02:49

## 2022-10-05 RX ADMIN — HYDROCODONE BITARTRATE AND ACETAMINOPHEN PRN TAB: 7.5; 325 TABLET ORAL at 10:49

## 2022-10-05 RX ADMIN — HYDROCODONE BITARTRATE AND ACETAMINOPHEN PRN TAB: 7.5; 325 TABLET ORAL at 14:55

## 2022-10-06 LAB
BASOPHILS # BLD AUTO: 0 THOU/UL (ref 0–0.2)
BASOPHILS NFR BLD AUTO: 0.4 % (ref 0–1)
EOSINOPHIL # BLD AUTO: 0.1 THOU/UL (ref 0–0.7)
EOSINOPHIL NFR BLD AUTO: 0.7 % (ref 0–10)
HGB BLD-MCNC: 10.8 G/DL (ref 14–18)
LYMPHOCYTES # BLD: 2.7 THOU/UL (ref 1.2–3.4)
LYMPHOCYTES NFR BLD AUTO: 26.6 % (ref 21–51)
MCH RBC QN AUTO: 32.1 PG (ref 27–31)
MCV RBC AUTO: 97 FL (ref 78–98)
MONOCYTES # BLD AUTO: 0.9 THOU/UL (ref 0.11–0.59)
MONOCYTES NFR BLD AUTO: 9.3 % (ref 0–10)
NEUTROPHILS # BLD AUTO: 6.4 THOU/UL (ref 1.4–6.5)
NEUTROPHILS NFR BLD AUTO: 63.1 % (ref 42–75)
PLATELET # BLD AUTO: 192 THOU/UL (ref 130–400)
RBC # BLD AUTO: 3.35 MILL/UL (ref 4.7–6.1)
WBC # BLD AUTO: 10.1 THOU/UL (ref 4.8–10.8)

## 2022-10-06 RX ADMIN — ALUMINUM ZIRCONIUM TRICHLOROHYDREX GLY SCH: 0.2 STICK TOPICAL at 16:47

## 2022-10-06 RX ADMIN — HYDROCODONE BITARTRATE AND ACETAMINOPHEN PRN TAB: 7.5; 325 TABLET ORAL at 09:51

## 2022-10-06 RX ADMIN — HYDROCODONE BITARTRATE AND ACETAMINOPHEN PRN TAB: 7.5; 325 TABLET ORAL at 03:33

## 2022-10-06 RX ADMIN — HYDROCODONE BITARTRATE AND ACETAMINOPHEN PRN TAB: 7.5; 325 TABLET ORAL at 18:56

## 2022-10-06 RX ADMIN — ALUMINUM ZIRCONIUM TRICHLOROHYDREX GLY SCH: 0.2 STICK TOPICAL at 16:21

## 2022-10-07 RX ADMIN — HYDROCODONE BITARTRATE AND ACETAMINOPHEN PRN TAB: 7.5; 325 TABLET ORAL at 10:03

## 2022-10-07 RX ADMIN — HYDROCODONE BITARTRATE AND ACETAMINOPHEN PRN TAB: 7.5; 325 TABLET ORAL at 01:13

## 2022-10-07 RX ADMIN — HYDROCODONE BITARTRATE AND ACETAMINOPHEN PRN TAB: 7.5; 325 TABLET ORAL at 20:13

## 2022-10-07 RX ADMIN — ALUMINUM ZIRCONIUM TRICHLOROHYDREX GLY SCH EACH: 0.2 STICK TOPICAL at 20:16

## 2022-10-08 RX ADMIN — HYDROCODONE BITARTRATE AND ACETAMINOPHEN PRN TAB: 7.5; 325 TABLET ORAL at 20:45

## 2022-10-08 RX ADMIN — DOCUSATE SODIUM 50 MG AND SENNOSIDES 8.6 MG PRN TAB: 8.6; 5 TABLET, FILM COATED ORAL at 10:19

## 2022-10-08 RX ADMIN — HYDROCODONE BITARTRATE AND ACETAMINOPHEN PRN TAB: 7.5; 325 TABLET ORAL at 10:28

## 2022-10-08 RX ADMIN — HYDROCODONE BITARTRATE AND ACETAMINOPHEN PRN TAB: 7.5; 325 TABLET ORAL at 14:36

## 2022-10-08 RX ADMIN — ALUMINUM ZIRCONIUM TRICHLOROHYDREX GLY SCH EACH: 0.2 STICK TOPICAL at 20:38

## 2022-10-09 RX ADMIN — HYDROCODONE BITARTRATE AND ACETAMINOPHEN PRN TAB: 7.5; 325 TABLET ORAL at 00:57

## 2022-10-09 RX ADMIN — HYDROCODONE BITARTRATE AND ACETAMINOPHEN PRN TAB: 7.5; 325 TABLET ORAL at 07:39

## 2022-10-09 RX ADMIN — DOCUSATE SODIUM 50 MG AND SENNOSIDES 8.6 MG PRN TAB: 8.6; 5 TABLET, FILM COATED ORAL at 17:12

## 2022-10-09 RX ADMIN — ACETAMINOPHEN AND CODEINE PHOSPHATE PRN TAB: 300; 30 TABLET ORAL at 20:40

## 2022-10-09 RX ADMIN — HYDROCODONE BITARTRATE AND ACETAMINOPHEN PRN TAB: 7.5; 325 TABLET ORAL at 17:11

## 2022-10-09 RX ADMIN — ACETAMINOPHEN AND CODEINE PHOSPHATE PRN TAB: 300; 30 TABLET ORAL at 13:08

## 2022-10-09 RX ADMIN — ALUMINUM ZIRCONIUM TRICHLOROHYDREX GLY SCH EACH: 0.2 STICK TOPICAL at 20:42

## 2022-10-09 RX ADMIN — HYDROCODONE BITARTRATE AND ACETAMINOPHEN PRN TAB: 7.5; 325 TABLET ORAL at 22:55

## 2022-10-10 VITALS — SYSTOLIC BLOOD PRESSURE: 122 MMHG | TEMPERATURE: 98.2 F | DIASTOLIC BLOOD PRESSURE: 73 MMHG

## 2022-10-10 RX ADMIN — ACETAMINOPHEN AND CODEINE PHOSPHATE PRN TAB: 300; 30 TABLET ORAL at 04:38

## 2022-10-10 RX ADMIN — DOCUSATE SODIUM 50 MG AND SENNOSIDES 8.6 MG PRN TAB: 8.6; 5 TABLET, FILM COATED ORAL at 10:12

## 2022-11-14 ENCOUNTER — HOSPITAL ENCOUNTER (EMERGENCY)
Dept: HOSPITAL 18 - NAV ERS | Age: 72
Discharge: HOME | End: 2022-11-14
Payer: COMMERCIAL

## 2022-11-14 ENCOUNTER — HOSPITAL ENCOUNTER (OUTPATIENT)
Dept: HOSPITAL 92 - SCSRAD | Age: 72
Discharge: HOME | End: 2022-11-14
Attending: NEUROLOGICAL SURGERY
Payer: MEDICARE

## 2022-11-14 DIAGNOSIS — K21.9: ICD-10-CM

## 2022-11-14 DIAGNOSIS — S60.222A: Primary | ICD-10-CM

## 2022-11-14 DIAGNOSIS — Z98.890: ICD-10-CM

## 2022-11-14 DIAGNOSIS — I10: ICD-10-CM

## 2022-11-14 DIAGNOSIS — M47.26: Primary | ICD-10-CM

## 2022-11-14 PROCEDURE — 72100 X-RAY EXAM L-S SPINE 2/3 VWS: CPT

## 2023-06-12 ENCOUNTER — HOSPITAL ENCOUNTER (EMERGENCY)
Dept: HOSPITAL 92 - ERS | Age: 73
Discharge: HOME | End: 2023-06-12
Payer: COMMERCIAL

## 2023-06-12 DIAGNOSIS — Z79.899: ICD-10-CM

## 2023-06-12 DIAGNOSIS — N39.0: Primary | ICD-10-CM

## 2023-06-12 DIAGNOSIS — Z79.82: ICD-10-CM

## 2023-06-12 DIAGNOSIS — M54.50: ICD-10-CM

## 2023-06-12 DIAGNOSIS — I10: ICD-10-CM

## 2023-06-12 DIAGNOSIS — D72.829: ICD-10-CM

## 2023-06-12 DIAGNOSIS — K21.9: ICD-10-CM

## 2023-06-12 LAB
ANION GAP SERPL CALC-SCNC: 13 MMOL/L (ref 10–20)
BACTERIA UR QL AUTO: (no result) HPF
BASOPHILS # BLD AUTO: 0 THOU/UL (ref 0–0.2)
BASOPHILS NFR BLD AUTO: 0.2 % (ref 0–1)
BUN SERPL-MCNC: 18 MG/DL (ref 8.4–25.7)
CALCIUM SERPL-MCNC: 9.4 MG/DL (ref 7.8–10.44)
CAUTI INDICATIONS FOR CULTURE: (no result)
CHLORIDE SERPL-SCNC: 104 MMOL/L (ref 98–107)
CO2 SERPL-SCNC: 23 MMOL/L (ref 23–31)
CREAT CL PREDICTED SERPL C-G-VRATE: 0 ML/MIN (ref 70–130)
EOSINOPHIL # BLD AUTO: 0 THOU/UL (ref 0–0.7)
EOSINOPHIL NFR BLD AUTO: 0 % (ref 0–10)
GLUCOSE SERPL-MCNC: 127 MG/DL (ref 83–110)
GLUCOSE UR STRIP-MCNC: 200 MG/DL
HGB BLD-MCNC: 11.3 G/DL (ref 14–18)
LEUKOCYTE ESTERASE UR QL STRIP.AUTO: 75 LEU/UL
LYMPHOCYTES NFR BLD AUTO: 3.6 % (ref 21–51)
MCH RBC QN AUTO: 23.7 PG (ref 27–31)
MCV RBC AUTO: 77.6 FL (ref 78–98)
MONOCYTES # BLD AUTO: 0.6 THOU/UL (ref 0.11–0.59)
MONOCYTES NFR BLD AUTO: 4 % (ref 0–10)
NEUTROPHILS # BLD AUTO: 14 THOU/UL (ref 1.4–6.5)
NEUTROPHILS NFR BLD AUTO: 91 % (ref 42–75)
PLATELET # BLD AUTO: 252 10X3/UL (ref 130–400)
POTASSIUM SERPL-SCNC: 3.4 MMOL/L (ref 3.5–5.1)
PROT UR STRIP.AUTO-MCNC: 50 MG/DL
RBC # BLD AUTO: 4.77 MILL/UL (ref 4.7–6.1)
RBC UR QL AUTO: (no result) HPF (ref 0–3)
SODIUM SERPL-SCNC: 137 MMOL/L (ref 136–145)
SP GR UR STRIP: 1.03 (ref 1–1.04)
WBC # BLD AUTO: 15.4 10X3/UL (ref 4.8–10.8)
WBC UR QL AUTO: (no result) HPF (ref 0–3)

## 2023-06-12 PROCEDURE — 87186 SC STD MICRODIL/AGAR DIL: CPT

## 2023-06-12 PROCEDURE — 80048 BASIC METABOLIC PNL TOTAL CA: CPT

## 2023-06-12 PROCEDURE — 81001 URINALYSIS AUTO W/SCOPE: CPT

## 2023-06-12 PROCEDURE — 96365 THER/PROPH/DIAG IV INF INIT: CPT

## 2023-06-12 PROCEDURE — 85025 COMPLETE CBC W/AUTO DIFF WBC: CPT

## 2023-06-12 PROCEDURE — 87086 URINE CULTURE/COLONY COUNT: CPT

## 2023-06-12 PROCEDURE — 87077 CULTURE AEROBIC IDENTIFY: CPT

## 2023-10-04 ENCOUNTER — HOSPITAL ENCOUNTER (EMERGENCY)
Dept: HOSPITAL 92 - ERS | Age: 73
Discharge: HOME | End: 2023-10-04
Payer: COMMERCIAL

## 2023-10-04 DIAGNOSIS — M54.50: Primary | ICD-10-CM

## 2023-10-04 DIAGNOSIS — K21.9: ICD-10-CM

## 2023-10-04 DIAGNOSIS — M19.90: ICD-10-CM

## 2023-10-04 DIAGNOSIS — B20: ICD-10-CM

## 2023-10-04 DIAGNOSIS — G62.9: ICD-10-CM

## 2023-10-04 DIAGNOSIS — I10: ICD-10-CM

## 2023-10-04 DIAGNOSIS — Z79.82: ICD-10-CM

## 2023-10-04 DIAGNOSIS — Z79.899: ICD-10-CM

## 2023-10-04 DIAGNOSIS — R25.1: ICD-10-CM

## 2023-10-04 LAB
ALBUMIN SERPL BCG-MCNC: 4.2 G/DL (ref 3.4–4.8)
ALP SERPL-CCNC: 81 U/L (ref 40–110)
ALT SERPL W P-5'-P-CCNC: 15 U/L (ref 8–55)
ANION GAP SERPL CALC-SCNC: 14 MMOL/L (ref 10–20)
AST SERPL-CCNC: 18 U/L (ref 5–34)
BASOPHILS # BLD AUTO: 0 THOU/UL (ref 0–0.2)
BASOPHILS NFR BLD AUTO: 0.8 % (ref 0–1)
BILIRUB SERPL-MCNC: 0.6 MG/DL (ref 0.2–1.2)
BUN SERPL-MCNC: 18 MG/DL (ref 8.4–25.7)
CALCIUM SERPL-MCNC: 9.4 MG/DL (ref 7.8–10.44)
CAUTI INDICATIONS FOR CULTURE: (no result)
CHLORIDE SERPL-SCNC: 102 MMOL/L (ref 98–107)
CO2 SERPL-SCNC: 27 MMOL/L (ref 23–31)
CREAT CL PREDICTED SERPL C-G-VRATE: 0 ML/MIN (ref 70–130)
EOSINOPHIL # BLD AUTO: 0.1 THOU/UL (ref 0–0.7)
EOSINOPHIL NFR BLD AUTO: 1 % (ref 0–10)
GLOBULIN SER CALC-MCNC: 2.7 G/DL (ref 2.4–3.5)
GLUCOSE SERPL-MCNC: 93 MG/DL (ref 83–110)
HCT VFR BLD CALC: 40.2 % (ref 42–52)
HGB BLD-MCNC: 12.3 G/DL (ref 14–18)
LYMPHOCYTES NFR BLD AUTO: 23.3 % (ref 21–51)
MCH RBC QN AUTO: 25.8 PG (ref 27–31)
MCV RBC AUTO: 84.3 FL (ref 78–98)
MONOCYTES # BLD AUTO: 0.4 THOU/UL (ref 0.11–0.59)
MONOCYTES NFR BLD AUTO: 7.3 % (ref 0–10)
NEUTROPHILS # BLD AUTO: 3.4 THOU/UL (ref 1.4–6.5)
NEUTROPHILS NFR BLD AUTO: 67.4 % (ref 42–75)
PLATELET # BLD AUTO: 237 10X3/UL (ref 130–400)
POTASSIUM SERPL-SCNC: 3.3 MMOL/L (ref 3.5–5.1)
RBC # BLD AUTO: 4.77 MILL/UL (ref 4.7–6.1)
SODIUM SERPL-SCNC: 140 MMOL/L (ref 136–145)
SP GR UR STRIP: 1.01 (ref 1–1.04)
WBC # BLD AUTO: 5.1 10X3/UL (ref 4.8–10.8)

## 2023-10-04 PROCEDURE — 80053 COMPREHEN METABOLIC PANEL: CPT

## 2023-10-04 PROCEDURE — 85025 COMPLETE CBC W/AUTO DIFF WBC: CPT

## 2023-10-04 PROCEDURE — 81001 URINALYSIS AUTO W/SCOPE: CPT

## 2023-10-04 PROCEDURE — 72131 CT LUMBAR SPINE W/O DYE: CPT

## 2023-10-04 PROCEDURE — 36415 COLL VENOUS BLD VENIPUNCTURE: CPT

## 2023-10-20 ENCOUNTER — HOSPITAL ENCOUNTER (EMERGENCY)
Dept: HOSPITAL 18 - NAV ERS | Age: 73
Discharge: HOME | End: 2023-10-20
Payer: COMMERCIAL

## 2023-10-20 DIAGNOSIS — M25.551: ICD-10-CM
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
## 2023-10-20 DIAGNOSIS — G62.9: ICD-10-CM

## 2023-10-20 DIAGNOSIS — I10: ICD-10-CM

## 2023-10-20 DIAGNOSIS — Z79.899: ICD-10-CM

## 2023-10-20 DIAGNOSIS — S80.01XA: Primary | ICD-10-CM

## 2023-10-20 DIAGNOSIS — M19.90: ICD-10-CM

## 2023-10-20 DIAGNOSIS — B20: ICD-10-CM

## 2023-10-20 PROCEDURE — 96372 THER/PROPH/DIAG INJ SC/IM: CPT
